# Patient Record
Sex: FEMALE | Race: OTHER | NOT HISPANIC OR LATINO
[De-identification: names, ages, dates, MRNs, and addresses within clinical notes are randomized per-mention and may not be internally consistent; named-entity substitution may affect disease eponyms.]

---

## 2017-11-14 ENCOUNTER — APPOINTMENT (OUTPATIENT)
Dept: ORTHOPEDIC SURGERY | Facility: CLINIC | Age: 37
End: 2017-11-14
Payer: COMMERCIAL

## 2017-11-14 VITALS
DIASTOLIC BLOOD PRESSURE: 89 MMHG | HEIGHT: 68 IN | BODY MASS INDEX: 36.98 KG/M2 | WEIGHT: 244 LBS | HEART RATE: 103 BPM | SYSTOLIC BLOOD PRESSURE: 140 MMHG

## 2017-11-14 VITALS — WEIGHT: 243 LBS | HEIGHT: 68 IN | BODY MASS INDEX: 36.83 KG/M2

## 2017-11-14 DIAGNOSIS — Z78.9 OTHER SPECIFIED HEALTH STATUS: ICD-10-CM

## 2017-11-14 DIAGNOSIS — Z86.018 PERSONAL HISTORY OF OTHER BENIGN NEOPLASM: ICD-10-CM

## 2017-11-14 DIAGNOSIS — Z82.61 FAMILY HISTORY OF ARTHRITIS: ICD-10-CM

## 2017-11-14 DIAGNOSIS — Z80.9 FAMILY HISTORY OF MALIGNANT NEOPLASM, UNSPECIFIED: ICD-10-CM

## 2017-11-14 DIAGNOSIS — Z87.09 PERSONAL HISTORY OF OTHER DISEASES OF THE RESPIRATORY SYSTEM: ICD-10-CM

## 2017-11-14 DIAGNOSIS — Z87.891 PERSONAL HISTORY OF NICOTINE DEPENDENCE: ICD-10-CM

## 2017-11-14 PROCEDURE — 73610 X-RAY EXAM OF ANKLE: CPT | Mod: LT

## 2017-11-14 PROCEDURE — 73620 X-RAY EXAM OF FOOT: CPT | Mod: LT

## 2017-11-14 PROCEDURE — 99204 OFFICE O/P NEW MOD 45 MIN: CPT

## 2017-11-14 RX ORDER — BIOTIN 10 MG
TABLET ORAL
Refills: 0 | Status: ACTIVE | COMMUNITY

## 2019-04-12 ENCOUNTER — TRANSCRIPTION ENCOUNTER (OUTPATIENT)
Age: 39
End: 2019-04-12

## 2019-12-27 ENCOUNTER — TRANSCRIPTION ENCOUNTER (OUTPATIENT)
Age: 39
End: 2019-12-27

## 2020-12-03 ENCOUNTER — ASOB RESULT (OUTPATIENT)
Age: 40
End: 2020-12-03

## 2020-12-03 ENCOUNTER — APPOINTMENT (OUTPATIENT)
Dept: MATERNAL FETAL MEDICINE | Facility: CLINIC | Age: 40
End: 2020-12-03
Payer: COMMERCIAL

## 2020-12-03 VITALS — HEIGHT: 68 IN | WEIGHT: 222 LBS | BODY MASS INDEX: 33.65 KG/M2

## 2020-12-03 PROCEDURE — 97802 MEDICAL NUTRITION INDIV IN: CPT | Mod: 95

## 2020-12-21 ENCOUNTER — APPOINTMENT (OUTPATIENT)
Dept: ANTEPARTUM | Facility: CLINIC | Age: 40
End: 2020-12-21
Payer: COMMERCIAL

## 2020-12-21 ENCOUNTER — ASOB RESULT (OUTPATIENT)
Age: 40
End: 2020-12-21

## 2020-12-21 PROCEDURE — 76813 OB US NUCHAL MEAS 1 GEST: CPT

## 2020-12-21 PROCEDURE — 99072 ADDL SUPL MATRL&STAF TM PHE: CPT

## 2020-12-21 PROCEDURE — 36416 COLLJ CAPILLARY BLOOD SPEC: CPT

## 2020-12-28 LAB
1ST TRIMESTER DATA: NORMAL
ADDENDUM DOC: NORMAL
AFP PNL SERPL: NORMAL
AFP SERPL-ACNC: NORMAL
CLINICAL BIOCHEMIST REVIEW: NORMAL
FREE BETA HCG 1ST TRIMESTER: NORMAL
Lab: NORMAL
NOTES NTD: NORMAL
NT: NORMAL
PAPP-A SERPL-ACNC: NORMAL
TRISOMY 18/3: NORMAL

## 2020-12-29 ENCOUNTER — APPOINTMENT (OUTPATIENT)
Dept: ANTEPARTUM | Facility: CLINIC | Age: 40
End: 2020-12-29
Payer: COMMERCIAL

## 2020-12-29 ENCOUNTER — ASOB RESULT (OUTPATIENT)
Age: 40
End: 2020-12-29

## 2020-12-29 ENCOUNTER — APPOINTMENT (OUTPATIENT)
Dept: MATERNAL FETAL MEDICINE | Facility: CLINIC | Age: 40
End: 2020-12-29
Payer: COMMERCIAL

## 2020-12-29 VITALS
BODY MASS INDEX: 34.25 KG/M2 | WEIGHT: 226 LBS | OXYGEN SATURATION: 98 % | RESPIRATION RATE: 18 BRPM | DIASTOLIC BLOOD PRESSURE: 82 MMHG | SYSTOLIC BLOOD PRESSURE: 144 MMHG | HEIGHT: 68 IN

## 2020-12-29 VITALS — SYSTOLIC BLOOD PRESSURE: 130 MMHG | DIASTOLIC BLOOD PRESSURE: 88 MMHG

## 2020-12-29 DIAGNOSIS — Z86.19 PERSONAL HISTORY OF OTHER INFECTIOUS AND PARASITIC DISEASES: ICD-10-CM

## 2020-12-29 PROCEDURE — 99244 OFF/OP CNSLTJ NEW/EST MOD 40: CPT

## 2020-12-29 PROCEDURE — 76817 TRANSVAGINAL US OBSTETRIC: CPT

## 2020-12-29 PROCEDURE — 99072 ADDL SUPL MATRL&STAF TM PHE: CPT

## 2020-12-29 RX ORDER — PRENATAL VIT NO.126/IRON/FOLIC 28MG-0.8MG
28-0.8 TABLET ORAL
Refills: 0 | Status: ACTIVE | COMMUNITY

## 2020-12-29 RX ORDER — MV-MIN/FOLIC/VIT K/LYCOP/COQ10 200-100MCG
CAPSULE ORAL
Refills: 0 | Status: ACTIVE | COMMUNITY

## 2020-12-29 RX ORDER — CHROMIUM 200 MCG
TABLET ORAL
Refills: 0 | Status: ACTIVE | COMMUNITY

## 2020-12-29 RX ORDER — PHENTERMINE HYDROCHLORIDE 37.5 MG/1
CAPSULE ORAL
Refills: 0 | Status: DISCONTINUED | COMMUNITY
End: 2020-12-29

## 2020-12-29 NOTE — FAMILY HISTORY
[Reported Family History Of Birth Defects] : no congenital heart defects [Tonio-Sachs Carrier] : no Tonio-Sachs [Family History] : no mental retardation/autism [Reported Family History Of Genetic Disease] : no history of child defect in child of baby father

## 2020-12-29 NOTE — DISCUSSION/SUMMARY
[FreeTextEntry1] : We had the pleasure of meeting with your patient, Nela Lynch, for a maternal-fetal medicine consultation. As you know, the patient is a 40-year-old  at 13 2/7 weeks of gestation. Her pregnancy is complicated by advanced maternal age, fibroid uterus, prior myomectomy, mild chronic hypertension, and bariatric surgery (sleeve gastrectomy).\par \par She reports abdominal pain for the past few days which she attributes to either gas or fibroids. She denies leaking and bleeding.\par \par She was extensively counseled regarding the following issues:\par \par •	Advanced maternal age (AMA)\par \par Women who are of advanced maternal age (typically defined as age 35 at delivery) are at an increased risk for fetal aneuploidy, spontaneous , congenital malformations, diabetes, and hypertensive disorders of pregnancy including preeclampsia,  delivery, stillbirth, and low birth weight neonates. Genetic counseling is recommended to discuss options for prenatal screening and diagnosis. First-trimester screening was low risk. Non-invasive prenatal screening (NIPS) is recommended. Early glucose challenge test (GCT) to screen for gestational diabetes is recommended. If negative, this should be repeated at 24-28 weeks. For women age 40, antepartum fetal surveillance should begin by 36 weeks.\par \par •	Fibroid uterus\par •	Prior myomectomy\par \par The patient had a laparoscopic myomectomy in . An operative report was not available but she reports that several intramural fibroids were removed and she was told that a  section would be necessary in future pregnancies. For pregnant patients who have had a prior myomectomy in which the integrity of the uterine wall was disrupted, there is an increased risk for uterine rupture, especially during labor. Timing of delivery is individualized based on the type and extent of myomectomy surgery. Given the history of multiple large incisions on the uterus, a scheduled  delivery at 37 0/7 weeks is recommended, prior to the onset of labor to reduce the risk of uterine rupture (ACOG Committee Opinion #764). The placenta should be thoroughly evaluated throughout gestation for any evidence of placenta accreta. Of note, the patient reports that she is willing to receive clinically indicated blood products in a life-threatening situation. Regarding her current pain symptoms, I discussed that management options are limited in pregnancy. I suggested Colace for constipation/gas pain.\par \par •	Pregnancy with history of bariatric surgery (sleeve gastrectomy)\par \par Pregnancy outcomes are generally favorable in patients with a history of bariatric surgery. The main risks are related to residual obesity (her BMI is 33 kg/m2). Recent laboratory evaluation of nutritional status was not available for review. She recently met with our nutritionist and laboratory requisition was provided. It should be determined if she has any nutritional deficiencies and whether she requires additional supplementation (e.g. folate, iron, B12, calcium, vitamin D). Nutritional assessment should be repeated again in the third trimester to ensure adequate supplementation and to assess need for IM/IV routes of administration if inadequate. \par \par •	Chronic hypertension\par \par Women with pre-existing hypertension are at an increased risk for exacerbation of hypertension and super-imposed preeclampsia. The patient was previously on anti-hyerptensive medication but her blood pressures improved after bariatric surgery and they were discontinued. Today, he blood pressures was mildly elevated but normal on repeat measurement. It is recommended that women with chronic hypertension have baseline laboratory workup that includes: 24 hour urine protein collection or protein/creatinine ratio to establish a baseline of proteinuria, comprehensive metabolic profile (CMP) to evaluate renal and liver function, complete blood count (CBC) to check platelets and an electrocardiogram (ECG). Recent laboratory results were not available for review and it is not clear whether these were performed in early pregnancy. The fetus is at an increased risk for growth restriction. Therefore, ultrasounds for growth are recommended every 4 weeks after 20 weeks. Antepartum surveillance is recommended starting at 36 weeks. Home blood pressure monitoring is recommended. We discussed that the goal of antihypertensive treatment in pregnancy is to keep blood pressures out of the severe range (160/110). At this time, she does not require anti-hypertensive medication. However, I do recommend low-dose aspirin for preeclampsia risk reduction.\par \par \par Thank you for requesting a consultation on this patient for advanced maternal age, fibroid uterus, prior myomectomy, mild chronic hypertension, and bariatric surgery (sleeve gastrectomy). The majority of time (>50%) was spent on counseling and coordination of care with this patient. The approximate physician face-to-face time was 60 minutes.\par \par At the end of our discussion, the patient indicated that her questions were answered and she seemed satisfied with our discussion. Please do not hesitate to contact us with any questions.\par \par \par Sincerely,\par \par \par Price Flores MD, RYAN\par Attending Physician, Maternal-Fetal Medicine\par

## 2020-12-29 NOTE — CHIEF COMPLAINT
[G ___] : G [unfilled] [P ___] : P [unfilled] [de-identified] : advanced maternal age, fibroid uterus, prior myomectomy, mild chronic hypertension, and bariatric surgery (sleeve gastrectomy)

## 2020-12-29 NOTE — SURGICAL HISTORY
[Fibroids] : fibroids [Abn Paps] : no abnormal pap smears [Breast Disease] : no breast disease [STI's] : STI's [Infertility] : no infertility [Cysts] : cysts [OC Use] : no OC use [Last Pap: ___] : Last Pap: none [Last Mammo: ___] : Last Mammo: none

## 2020-12-29 NOTE — VITALS
[LMP (date): ___] : LMP was on [unfilled] [GA =___ Weeks] : which calculates to a GA of [unfilled] weeks [GA= ___ Days] : and [unfilled] day(s) [HERLINDA by LMP (date): ___] : The calculated HERLINDA by LMP is [unfilled] [By LMP] : this is the final HERLINDA

## 2020-12-29 NOTE — OB HISTORY
[Pregnancy History] : girl [___] : pregnancy complications occured [LMP: ___] : LMP: [unfilled] [HERLINDA: ___] : HERLINDA: [unfilled] [EGA: ___ wks] : EGA: [unfilled] wks [Spontaneous] : Spontaneous conception [Sonogram] : sonogram [at ___ wks] : at [unfilled] weeks [FreeTextEntry1] : [facility] due to fibroid pain.  Pt states the pain started on 12/27/20- switches from left to right on her abdomen and her pelvis.  Pt states she has been feeling very gassy and has been having diarrhea episodes frequently.  Pt thinks it is due to her diet during the holiday.  Pt eats vegan/gluten free but didn’t during the holiday.  Pt is AMA and has not seen GC.  Pt saw dietary because of hx of gastric sleeve in 4/2019.  Pt last saw specialist earlier this year.  Pt to get bariatric blood drawn ASAP- requisition given to patient to get blood drawn.  Pt has f/u with dietary on 1/25/2020- consult attached.  Pt has laparoscopic myomectomy in 4/2015.  Pt states for about a year she was taking BP medication and stopped taking due to her BP normalizing after losing weight.  Medication was stopped earlier this year as per pt.  Pt states she was dx with asthma as a child and has psoriatic arthritis- pt does not see a rheumatologist.  Pt states she has hx of chlamydia in 1998 which was treated.  No leaking or bleeding/cramping or ctx pain.  [Definite:  ___ (Date)] : the last menstrual period was [unfilled] [Normal Amount/Duration] : was of a normal amount and duration [Spotting Between  Menses] : no spotting between menses [Regular Cycle Intervals] : periods have been regular

## 2021-01-15 ENCOUNTER — APPOINTMENT (OUTPATIENT)
Dept: MATERNAL FETAL MEDICINE | Facility: CLINIC | Age: 41
End: 2021-01-15
Payer: COMMERCIAL

## 2021-01-15 ENCOUNTER — ASOB RESULT (OUTPATIENT)
Age: 41
End: 2021-01-15

## 2021-01-15 PROCEDURE — 99202 OFFICE O/P NEW SF 15 MIN: CPT | Mod: 95

## 2021-01-25 ENCOUNTER — APPOINTMENT (OUTPATIENT)
Dept: MATERNAL FETAL MEDICINE | Facility: CLINIC | Age: 41
End: 2021-01-25
Payer: COMMERCIAL

## 2021-01-25 ENCOUNTER — ASOB RESULT (OUTPATIENT)
Age: 41
End: 2021-01-25

## 2021-01-25 VITALS — WEIGHT: 228 LBS | HEIGHT: 68 IN | BODY MASS INDEX: 34.56 KG/M2

## 2021-01-25 LAB
25(OH)D3 SERPL-MCNC: 43.1 NG/ML
BASOPHILS # BLD AUTO: 0.03 K/UL
BASOPHILS NFR BLD AUTO: 0.3 %
CA-I SERPL-SCNC: 1.37 MMOL/L
CALCIUM SERPL-MCNC: 10.3 MG/DL
EOSINOPHIL # BLD AUTO: 0.1 K/UL
EOSINOPHIL NFR BLD AUTO: 1.1 %
ESTIMATED AVERAGE GLUCOSE: 103 MG/DL
FERRITIN SERPL-MCNC: 31 NG/ML
FOLATE SERPL-MCNC: 16.8 NG/ML
HBA1C MFR BLD HPLC: 5.2 %
HCT VFR BLD CALC: 33.9 %
HGB BLD-MCNC: 11.2 G/DL
IMM GRANULOCYTES NFR BLD AUTO: 0.2 %
IRON SATN MFR SERPL: 14 %
IRON SERPL-MCNC: 58 UG/DL
LYMPHOCYTES # BLD AUTO: 1.79 K/UL
LYMPHOCYTES NFR BLD AUTO: 20.6 %
MAN DIFF?: NORMAL
MCHC RBC-ENTMCNC: 30.7 PG
MCHC RBC-ENTMCNC: 33 GM/DL
MCV RBC AUTO: 92.9 FL
MONOCYTES # BLD AUTO: 0.43 K/UL
MONOCYTES NFR BLD AUTO: 4.9 %
NEUTROPHILS # BLD AUTO: 6.33 K/UL
NEUTROPHILS NFR BLD AUTO: 72.9 %
PLATELET # BLD AUTO: 357 K/UL
RBC # BLD: 3.65 M/UL
RBC # FLD: 13.2 %
TIBC SERPL-MCNC: 418 UG/DL
UIBC SERPL-MCNC: 360 UG/DL
VIT B12 SERPL-MCNC: 873 PG/ML
WBC # FLD AUTO: 8.7 K/UL

## 2021-01-25 PROCEDURE — 97803 MED NUTRITION INDIV SUBSEQ: CPT | Mod: 95

## 2021-01-27 LAB
VIT B1 SERPL-MCNC: 122 NMOL/L
ZINC SERPL-MCNC: 91 UG/DL

## 2021-01-29 LAB — VIT A SERPL-MCNC: 41.6 UG/DL

## 2021-02-02 LAB
A-TOCOPHEROL VIT E SERPL-MCNC: 14 MG/L
BETA+GAMMA TOCOPHEROL SERPL-MCNC: 1.4 MG/L

## 2021-02-15 ENCOUNTER — ASOB RESULT (OUTPATIENT)
Age: 41
End: 2021-02-15

## 2021-02-15 ENCOUNTER — APPOINTMENT (OUTPATIENT)
Dept: ANTEPARTUM | Facility: CLINIC | Age: 41
End: 2021-02-15
Payer: COMMERCIAL

## 2021-02-15 ENCOUNTER — APPOINTMENT (OUTPATIENT)
Dept: MATERNAL FETAL MEDICINE | Facility: CLINIC | Age: 41
End: 2021-02-15
Payer: COMMERCIAL

## 2021-02-15 VITALS
OXYGEN SATURATION: 98 % | RESPIRATION RATE: 18 BRPM | HEIGHT: 68 IN | DIASTOLIC BLOOD PRESSURE: 68 MMHG | SYSTOLIC BLOOD PRESSURE: 102 MMHG | WEIGHT: 234 LBS | BODY MASS INDEX: 35.46 KG/M2 | HEART RATE: 73 BPM

## 2021-02-15 DIAGNOSIS — O99.841 BARIATRIC SURGERY STATUS COMPLICATING PREGNANCY, FIRST TRIMESTER: ICD-10-CM

## 2021-02-15 PROCEDURE — 76811 OB US DETAILED SNGL FETUS: CPT

## 2021-02-15 PROCEDURE — 99242 OFF/OP CONSLTJ NEW/EST SF 20: CPT

## 2021-02-15 PROCEDURE — 76817 TRANSVAGINAL US OBSTETRIC: CPT

## 2021-02-15 PROCEDURE — 99072 ADDL SUPL MATRL&STAF TM PHE: CPT

## 2021-02-15 NOTE — ACTIVE PROBLEMS
[Diabetes Mellitus] : no diabetes mellitus [Hypertension] : no hypertension [Autoimmune Disease] : no autoimmune disease [Heart Disease] : no heart disease [Renal Disease] : no kidney disease, no UTI [Psychiatric Disorders] : no psychiatric disorders [Neurologic Disorder] : no neurologic disorder, no epilepsy [Depression] : no depression, no post partum depression [Hepatic Disorder] : no hepatitis, no liver disease [Thrombophlebitis] : no varicosities, no phlebitis [Trauma] : no trauma/violence [Thyroid Disorder] : no thyroid dysfunction [Blood Transfusion (___ Ml)] : no history of blood transfusion

## 2021-02-15 NOTE — CHIEF COMPLAINT
[G ___] : G [unfilled] [P ___] : P [unfilled] [de-identified] : follow-up for advanced maternal age, fibroid uterus, prior myomectomy, mild chronic hypertension (resolved after weight loss), and bariatric surgery (sleeve gastrectomy)

## 2021-02-15 NOTE — SURGICAL HISTORY
[Fibroids] : fibroids [Abn Paps] : no abnormal pap smears [Breast Disease] : no breast disease [STI's] : STI's [Infertility] : no infertility [OC Use] : no OC use [Cysts] : cysts [Last Pap: ___] : Last Pap: none [Last Mammo: ___] : Last Mammo: none

## 2021-02-15 NOTE — DISCUSSION/SUMMARY
[FreeTextEntry1] : We had the pleasure of meeting with your patient, Nela Lynch, for a maternal-fetal medicine consultation. As you know, the patient is a 40-year-old  at 20 1/7 weeks of gestation. Her pregnancy is complicated by advanced maternal age, fibroid uterus, prior myomectomy, mild chronic hypertension, and bariatric surgery (sleeve gastrectomy).\par \par She reports abdominal pain for the past few days which she attributes to either gas or fibroids. She denies leaking and bleeding.\par \par She was counseled regarding the following issues:\par \par •	Fibroid uterus and prior myomectomy\par \par Scheduled  delivery at 37 0/7 weeks is recommended, prior to the onset of labor to reduce the risk of uterine rupture (ACOG Committee Opinion #[id number]). \par \par •	Pregnancy with history of bariatric surgery (sleeve gastrectomy)\par \par Recent laboratory evaluation of nutritional status was normal. She is followed by our nutritionist. Nutritional assessment should be repeated again in the third trimester to ensure adequate supplementation and to assess need for IM/IV routes of administration if inadequate. \par \par •	Chronic hypertension, improved after weight loss/gastric sleeve\par \par Blood pressure normal today. The fetus is at an increased risk for growth restriction. Serial ultrasounds for growth recommended. Home blood pressure monitoring is recommended. At this time, she does not require anti-hypertensive medication. Continue low-dose aspirin for preeclampsia risk reduction.\par \par Follow-up in 2 weeks for anatomy completion ultrasound\par Follow-up in 8 weeks for growth ultrasound and MFM consultation\par \par \par Thank you for requesting a consultation on this patient for advanced maternal age, fibroid uterus, prior myomectomy, mild chronic hypertension, and bariatric surgery (sleeve gastrectomy). The majority of time (>50%) was spent on counseling and coordination of care with this patient. The approximate physician face-to-face time was 30 minutes.\par \par At the end of our discussion, the patient indicated that her questions were answered and she seemed satisfied with our discussion. Please do not hesitate to contact us with any questions.\par \par \par Sincerely,\par \par \par Price Flores MD, RYNA\par Attending Physician, Maternal-Fetal Medicine\par

## 2021-02-15 NOTE — OB HISTORY
[Pregnancy History] : girl [___] : pregnancy complications occured [LMP: ___] : LMP: [unfilled] [HERLINDA: ___] : HERLINDA: [unfilled] [EGA: ___ wks] : EGA: [unfilled] wks [Spontaneous] : Spontaneous conception [Sonogram] : sonogram [at ___ wks] : at [unfilled] weeks [Definite:  ___ (Date)] : the last menstrual period was [unfilled] [Normal Amount/Duration] : was of a normal amount and duration [Spotting Between  Menses] : no spotting between menses [Regular Cycle Intervals] : periods have been regular

## 2021-02-18 LAB
1ST TRIMESTER DATA: NORMAL
2ND TRIMESTER DATA: NORMAL
AFP PNL SERPL: NORMAL
AFP SERPL-ACNC: NORMAL
AFP SERPL-ACNC: NORMAL
B-HCG FREE SERPL-MCNC: NORMAL
CLINICAL BIOCHEMIST REVIEW: NORMAL
FREE BETA HCG 1ST TRIMESTER: NORMAL
INHIBIN A SERPL-MCNC: NORMAL
NOTES NTD: NORMAL
NT: NORMAL
PAPP-A SERPL-ACNC: NORMAL
U ESTRIOL SERPL-SCNC: NORMAL

## 2021-02-24 ENCOUNTER — ASOB RESULT (OUTPATIENT)
Age: 41
End: 2021-02-24

## 2021-02-24 ENCOUNTER — APPOINTMENT (OUTPATIENT)
Dept: ANTEPARTUM | Facility: CLINIC | Age: 41
End: 2021-02-24
Payer: COMMERCIAL

## 2021-02-24 PROCEDURE — 76816 OB US FOLLOW-UP PER FETUS: CPT

## 2021-02-24 PROCEDURE — 99072 ADDL SUPL MATRL&STAF TM PHE: CPT

## 2021-03-01 ENCOUNTER — APPOINTMENT (OUTPATIENT)
Dept: ANTEPARTUM | Facility: CLINIC | Age: 41
End: 2021-03-01

## 2021-03-03 ENCOUNTER — ASOB RESULT (OUTPATIENT)
Age: 41
End: 2021-03-03

## 2021-03-03 ENCOUNTER — APPOINTMENT (OUTPATIENT)
Dept: ANTEPARTUM | Facility: CLINIC | Age: 41
End: 2021-03-03
Payer: COMMERCIAL

## 2021-03-03 PROCEDURE — 76816 OB US FOLLOW-UP PER FETUS: CPT

## 2021-03-03 PROCEDURE — 99072 ADDL SUPL MATRL&STAF TM PHE: CPT

## 2021-03-08 ENCOUNTER — ASOB RESULT (OUTPATIENT)
Age: 41
End: 2021-03-08

## 2021-03-08 ENCOUNTER — APPOINTMENT (OUTPATIENT)
Dept: MATERNAL FETAL MEDICINE | Facility: CLINIC | Age: 41
End: 2021-03-08
Payer: COMMERCIAL

## 2021-03-08 VITALS — WEIGHT: 232 LBS | BODY MASS INDEX: 35.16 KG/M2 | HEIGHT: 68 IN

## 2021-03-08 PROCEDURE — G0108 DIAB MANAGE TRN  PER INDIV: CPT | Mod: 95

## 2021-04-12 ENCOUNTER — APPOINTMENT (OUTPATIENT)
Dept: MATERNAL FETAL MEDICINE | Facility: CLINIC | Age: 41
End: 2021-04-12
Payer: COMMERCIAL

## 2021-04-12 ENCOUNTER — APPOINTMENT (OUTPATIENT)
Dept: ANTEPARTUM | Facility: CLINIC | Age: 41
End: 2021-04-12
Payer: COMMERCIAL

## 2021-04-12 ENCOUNTER — ASOB RESULT (OUTPATIENT)
Age: 41
End: 2021-04-12

## 2021-04-12 VITALS
SYSTOLIC BLOOD PRESSURE: 108 MMHG | DIASTOLIC BLOOD PRESSURE: 62 MMHG | OXYGEN SATURATION: 98 % | RESPIRATION RATE: 18 BRPM | HEART RATE: 68 BPM | WEIGHT: 245 LBS | HEIGHT: 68 IN | BODY MASS INDEX: 37.13 KG/M2

## 2021-04-12 PROCEDURE — 99072 ADDL SUPL MATRL&STAF TM PHE: CPT

## 2021-04-12 PROCEDURE — 76816 OB US FOLLOW-UP PER FETUS: CPT

## 2021-04-12 PROCEDURE — 99214 OFFICE O/P EST MOD 30 MIN: CPT

## 2021-04-12 RX ORDER — CHLORHEXIDINE GLUCONATE 4 %
325 (65 FE) LIQUID (ML) TOPICAL
Refills: 0 | Status: ACTIVE | COMMUNITY

## 2021-04-12 RX ORDER — CALCIUM CARBONATE/VITAMIN D3 600 MG-10
TABLET ORAL
Refills: 0 | Status: ACTIVE | COMMUNITY

## 2021-04-12 RX ORDER — CHROMIUM 200 MCG
TABLET ORAL
Refills: 0 | Status: ACTIVE | COMMUNITY

## 2021-04-12 RX ORDER — MAGNESIUM OXIDE/MAG AA CHELATE 300 MG
CAPSULE ORAL
Refills: 0 | Status: ACTIVE | COMMUNITY

## 2021-04-12 RX ORDER — BACILLUS COAGULANS/INULIN 1B-250 MG
CAPSULE ORAL
Refills: 0 | Status: ACTIVE | COMMUNITY

## 2021-04-12 NOTE — CHIEF COMPLAINT
[G ___] : G [unfilled] [P ___] : P [unfilled] [de-identified] : follow-up for advanced maternal age, fibroid uterus, prior myomectomy, mild chronic hypertension (resolved after weight loss), and bariatric surgery (sleeve gastrectomy)

## 2021-04-12 NOTE — DISCUSSION/SUMMARY
[FreeTextEntry1] : We had the pleasure of meeting with your patient, Nela Lynch, for a maternal-fetal medicine consultation. As you know, the patient is a 40-year-old  at 28 1/7 weeks of gestation. Her pregnancy is complicated by advanced maternal age, fibroid uterus, prior myomectomy, mild chronic hypertension, and bariatric surgery (sleeve gastrectomy).\par \par She denies cramping, leaking and bleeding. Good fetal movement noted. Denies headache, vision changes, CP/SOB, RUQ/epigastric pain, N/V. \par \par She was counseled regarding the following issues:\par \par •	Fibroid uterus and prior myomectomy\par \par Scheduled  delivery at 37 0/7 weeks is recommended, prior to the onset of labor to reduce the risk of uterine rupture (ACOG Committee Opinion #764). \par \par •	Pregnancy with history of bariatric surgery (sleeve gastrectomy)\par •	Iron deficiency anemia\par \par She is followed by our nutritionist. Labs normal with exception of iron deficiency. Last Hct 30. She will receive IV iron infusions starting on Friday of this week.\par \par •	Chronic hypertension, improved after weight loss/gastric sleeve\par \par Blood pressure normal today. Continue serial ultrasounds for growth. Anti-hypertensive medication not indicated. Continue low-dose aspirin for preeclampsia risk reduction.\par \par Follow-up in 4 weeks for growth ultrasound\par \par \par Thank you for requesting a consultation on this patient for advanced maternal age, fibroid uterus, prior myomectomy, mild chronic hypertension, and bariatric surgery (sleeve gastrectomy). The majority of time (>50%) was spent on counseling and coordination of care with this patient. The physician face-to-face time was 30 minutes.\par \par At the end of our discussion, the patient indicated that her questions were answered and she seemed satisfied with our discussion. Please do not hesitate to contact us with any questions.\par \par \par Sincerely,\par \par \par Price Flores MD, RYAN\par Attending Physician, Maternal-Fetal Medicine\par

## 2021-04-19 ENCOUNTER — APPOINTMENT (OUTPATIENT)
Dept: MATERNAL FETAL MEDICINE | Facility: CLINIC | Age: 41
End: 2021-04-19
Payer: COMMERCIAL

## 2021-04-19 ENCOUNTER — ASOB RESULT (OUTPATIENT)
Age: 41
End: 2021-04-19

## 2021-04-19 PROCEDURE — 97803 MED NUTRITION INDIV SUBSEQ: CPT | Mod: 95

## 2021-05-10 ENCOUNTER — APPOINTMENT (OUTPATIENT)
Dept: ANTEPARTUM | Facility: CLINIC | Age: 41
End: 2021-05-10
Payer: COMMERCIAL

## 2021-05-10 ENCOUNTER — ASOB RESULT (OUTPATIENT)
Age: 41
End: 2021-05-10

## 2021-05-10 PROCEDURE — 99072 ADDL SUPL MATRL&STAF TM PHE: CPT

## 2021-05-10 PROCEDURE — 76816 OB US FOLLOW-UP PER FETUS: CPT

## 2021-05-13 ENCOUNTER — NON-APPOINTMENT (OUTPATIENT)
Age: 41
End: 2021-05-13

## 2021-05-17 ENCOUNTER — ASOB RESULT (OUTPATIENT)
Age: 41
End: 2021-05-17

## 2021-05-17 ENCOUNTER — APPOINTMENT (OUTPATIENT)
Dept: MATERNAL FETAL MEDICINE | Facility: CLINIC | Age: 41
End: 2021-05-17
Payer: COMMERCIAL

## 2021-05-17 VITALS — HEIGHT: 68 IN | BODY MASS INDEX: 36.37 KG/M2 | WEIGHT: 240 LBS

## 2021-05-17 PROCEDURE — 97803 MED NUTRITION INDIV SUBSEQ: CPT | Mod: 95

## 2021-06-07 ENCOUNTER — APPOINTMENT (OUTPATIENT)
Dept: ANTEPARTUM | Facility: CLINIC | Age: 41
End: 2021-06-07

## 2021-06-07 ENCOUNTER — ASOB RESULT (OUTPATIENT)
Age: 41
End: 2021-06-07

## 2021-06-07 ENCOUNTER — APPOINTMENT (OUTPATIENT)
Dept: ANTEPARTUM | Facility: CLINIC | Age: 41
End: 2021-06-07
Payer: COMMERCIAL

## 2021-06-07 PROCEDURE — 76819 FETAL BIOPHYS PROFIL W/O NST: CPT

## 2021-06-07 PROCEDURE — 76816 OB US FOLLOW-UP PER FETUS: CPT

## 2021-06-07 PROCEDURE — 99072 ADDL SUPL MATRL&STAF TM PHE: CPT

## 2021-06-10 ENCOUNTER — APPOINTMENT (OUTPATIENT)
Dept: ANTEPARTUM | Facility: CLINIC | Age: 41
End: 2021-06-10
Payer: COMMERCIAL

## 2021-06-10 PROCEDURE — 99072 ADDL SUPL MATRL&STAF TM PHE: CPT

## 2021-06-10 PROCEDURE — 76818 FETAL BIOPHYS PROFILE W/NST: CPT

## 2021-06-14 ENCOUNTER — APPOINTMENT (OUTPATIENT)
Dept: ANTEPARTUM | Facility: CLINIC | Age: 41
End: 2021-06-14
Payer: COMMERCIAL

## 2021-06-14 ENCOUNTER — ASOB RESULT (OUTPATIENT)
Age: 41
End: 2021-06-14

## 2021-06-14 PROCEDURE — 99072 ADDL SUPL MATRL&STAF TM PHE: CPT

## 2021-06-14 PROCEDURE — 76818 FETAL BIOPHYS PROFILE W/NST: CPT

## 2024-01-05 ENCOUNTER — NON-APPOINTMENT (OUTPATIENT)
Age: 44
End: 2024-01-05

## 2024-01-05 ENCOUNTER — APPOINTMENT (OUTPATIENT)
Dept: INTERNAL MEDICINE | Facility: CLINIC | Age: 44
End: 2024-01-05
Payer: COMMERCIAL

## 2024-01-05 VITALS
TEMPERATURE: 97.5 F | WEIGHT: 236 LBS | BODY MASS INDEX: 35.77 KG/M2 | OXYGEN SATURATION: 98 % | HEIGHT: 68 IN | DIASTOLIC BLOOD PRESSURE: 70 MMHG | HEART RATE: 93 BPM | RESPIRATION RATE: 18 BRPM | SYSTOLIC BLOOD PRESSURE: 110 MMHG

## 2024-01-05 DIAGNOSIS — O16.9 UNSPECIFIED MATERNAL HYPERTENSION, UNSPECIFIED TRIMESTER: ICD-10-CM

## 2024-01-05 DIAGNOSIS — O09.521 SUPERVISION OF ELDERLY MULTIGRAVIDA, FIRST TRIMESTER: ICD-10-CM

## 2024-01-05 DIAGNOSIS — O99.840 BARIATRIC SURGERY STATUS COMPLICATING PREGNANCY, UNSPECIFIED TRIMESTER: ICD-10-CM

## 2024-01-05 DIAGNOSIS — I10 ESSENTIAL (PRIMARY) HYPERTENSION: ICD-10-CM

## 2024-01-05 DIAGNOSIS — Z78.9 OTHER SPECIFIED HEALTH STATUS: ICD-10-CM

## 2024-01-05 DIAGNOSIS — O99.210 OBESITY COMPLICATING PREGNANCY, UNSPECIFIED TRIMESTER: ICD-10-CM

## 2024-01-05 DIAGNOSIS — J45.20 MILD INTERMITTENT ASTHMA, UNCOMPLICATED: ICD-10-CM

## 2024-01-05 DIAGNOSIS — L40.50 ARTHROPATHIC PSORIASIS, UNSPECIFIED: ICD-10-CM

## 2024-01-05 DIAGNOSIS — E55.9 VITAMIN D DEFICIENCY, UNSPECIFIED: ICD-10-CM

## 2024-01-05 DIAGNOSIS — Z87.898 PERSONAL HISTORY OF OTHER SPECIFIED CONDITIONS: ICD-10-CM

## 2024-01-05 DIAGNOSIS — Z98.890 OTHER SPECIFIED POSTPROCEDURAL STATES: ICD-10-CM

## 2024-01-05 DIAGNOSIS — O35.10X0 MATERNAL CARE FOR (SUSPECTED) CHROMOSOMAL ABNORMALITY IN FETUS, UNSPECIFIED, NOT APPLICABLE OR UNSPECIFIED: ICD-10-CM

## 2024-01-05 DIAGNOSIS — M25.872 OTHER SPECIFIED JOINT DISORDERS, LEFT ANKLE AND FOOT: ICD-10-CM

## 2024-01-05 DIAGNOSIS — S90.859A SUPERFICIAL FOREIGN BODY, UNSPECIFIED FOOT, INITIAL ENCOUNTER: ICD-10-CM

## 2024-01-05 DIAGNOSIS — M25.572 PAIN IN LEFT ANKLE AND JOINTS OF LEFT FOOT: ICD-10-CM

## 2024-01-05 DIAGNOSIS — M24.9 JOINT DERANGEMENT, UNSPECIFIED: ICD-10-CM

## 2024-01-05 DIAGNOSIS — E78.5 HYPERLIPIDEMIA, UNSPECIFIED: ICD-10-CM

## 2024-01-05 DIAGNOSIS — M62.462 CONTRACTURE OF MUSCLE, LEFT LOWER LEG: ICD-10-CM

## 2024-01-05 DIAGNOSIS — D25.9 LEIOMYOMA OF UTERUS, UNSPECIFIED: ICD-10-CM

## 2024-01-05 DIAGNOSIS — Z00.00 ENCOUNTER FOR GENERAL ADULT MEDICAL EXAMINATION W/OUT ABNORMAL FINDINGS: ICD-10-CM

## 2024-01-05 DIAGNOSIS — D50.9 IRON DEFICIENCY ANEMIA, UNSPECIFIED: ICD-10-CM

## 2024-01-05 PROCEDURE — 99386 PREV VISIT NEW AGE 40-64: CPT

## 2024-01-05 RX ORDER — ASPIRIN 81 MG
81 TABLET, DELAYED RELEASE (ENTERIC COATED) ORAL
Refills: 0 | Status: DISCONTINUED | COMMUNITY
End: 2024-01-05

## 2024-01-05 RX ORDER — ATORVASTATIN CALCIUM 20 MG/1
20 TABLET, FILM COATED ORAL
Refills: 0 | Status: ACTIVE | COMMUNITY

## 2024-01-05 RX ORDER — ALBUTEROL SULFATE 90 UG/1
108 (90 BASE) INHALANT RESPIRATORY (INHALATION)
Qty: 1 | Refills: 1 | Status: ACTIVE | COMMUNITY
Start: 2024-01-05 | End: 1900-01-01

## 2024-01-05 RX ORDER — BUDESONIDE AND FORMOTEROL FUMARATE DIHYDRATE 160; 4.5 UG/1; UG/1
160-4.5 AEROSOL RESPIRATORY (INHALATION) TWICE DAILY
Qty: 1 | Refills: 2 | Status: ACTIVE | COMMUNITY
Start: 2024-01-05 | End: 1900-01-01

## 2024-01-05 RX ORDER — CHLORTHALIDONE 25 MG/1
25 TABLET ORAL
Refills: 0 | Status: ACTIVE | COMMUNITY

## 2024-01-05 RX ORDER — SEMAGLUTIDE 0.25 MG/.5ML
0.25 INJECTION, SOLUTION SUBCUTANEOUS
Refills: 0 | Status: ACTIVE | COMMUNITY

## 2024-01-05 NOTE — PLAN
[FreeTextEntry1] : HCM:  - UTD with pap smear  - Due for mammogram, script provided  - Check routine labs today   Iron deficiency anemia:  - Check iron studies, CBC, fatigue is likely related to underlying iron deficiency   HTN:  - BP well controlled  - Continue chlorthalidone 25 mg daily   HLD:  - Pt not consistent with statin  - Check CMP and lipid profile   Mild Intermittent asthma:  - Continue albuterol inhaler PRN and Symbicort  -

## 2024-01-05 NOTE — HEALTH RISK ASSESSMENT
[Good] : ~his/her~  mood as  good [Yes] : Yes [2 - 3 times a week (3 pts)] : 2 - 3  times a week (3 points) [1 or 2 (0 pts)] : 1 or 2 (0 points) [Never (0 pts)] : Never (0 points) [No] : In the past 12 months have you used drugs other than those required for medical reasons? No [Little interest or pleasure doing things] : 1) Little interest or pleasure doing things [2] : 1) Little interest or pleasure doing things for more than half of the days (2) [Feeling down, depressed, or hopeless] : 2) Feeling down, depressed, or hopeless [1] : 2) Feeling down, depressed, or hopeless for several days (1) [PHQ-2 Positive] : PHQ-2 Positive [1/2 of Days or More (2)] : 1.) Little interest or pleasure in doing things? Half the days or more [Several Days (1)] : 8.) Moving or speaking so slowly that other people could have noticed, or the opposite, moving or speaking faster than usual? Several days [Mild] : severity of depression is mild [Not at all] : How difficult have these problems made it for you to do your work, take care of things at home, or get along with people? Not at all [Patient reported mammogram was normal] : Patient reported mammogram was normal [Patient reported PAP Smear was normal] : Patient reported PAP Smear was normal [With Family] : lives with family [Employed] : employed [] :  [# Of Children ___] : has [unfilled] children [Feels Safe at Home] : Feels safe at home [Fully functional (bathing, dressing, toileting, transferring, walking, feeding)] : Fully functional (bathing, dressing, toileting, transferring, walking, feeding) [Fully functional (using the telephone, shopping, preparing meals, housekeeping, doing laundry, using] : Fully functional and needs no help or supervision to perform IADLs (using the telephone, shopping, preparing meals, housekeeping, doing laundry, using transportation, managing medications and managing finances) [Never] : Never [Audit-CScore] : 3 [DCW5Dipet] : 3 [[id number]] : 9 [MammogramDate] : 01/21 [PapSmearDate] : 01/23

## 2024-01-05 NOTE — HISTORY OF PRESENT ILLNESS
[FreeTextEntry1] : CPE  [de-identified] : 43 year old female with PMH HTN, HLD, psoriatic arthritis, iron deficiency anemia, vitamin B12 deficiency, mild intermittent asthma who presents today to establish care/for CPE.  She is doing well overall. She has noticed that over the past few weeks she has been very fatigued and tired. She has not been consistent with taking her iron supplements. She has a history of iron deficiency anemia 2/2 heavy menses due to fibroids and uterine polyps.

## 2024-01-08 LAB
25(OH)D3 SERPL-MCNC: 73.7 NG/ML
ALBUMIN SERPL ELPH-MCNC: 4.8 G/DL
ALP BLD-CCNC: 79 U/L
ALT SERPL-CCNC: 35 U/L
ANION GAP SERPL CALC-SCNC: 14 MMOL/L
AST SERPL-CCNC: 40 U/L
BASOPHILS # BLD AUTO: 0.03 K/UL
BASOPHILS NFR BLD AUTO: 0.4 %
BILIRUB SERPL-MCNC: 0.5 MG/DL
BUN SERPL-MCNC: 12 MG/DL
CALCIUM SERPL-MCNC: 10 MG/DL
CHLORIDE SERPL-SCNC: 96 MMOL/L
CHOLEST SERPL-MCNC: 267 MG/DL
CO2 SERPL-SCNC: 30 MMOL/L
CREAT SERPL-MCNC: 0.89 MG/DL
EGFR: 82 ML/MIN/1.73M2
EOSINOPHIL # BLD AUTO: 0.15 K/UL
EOSINOPHIL NFR BLD AUTO: 1.9 %
ESTIMATED AVERAGE GLUCOSE: 117 MG/DL
FERRITIN SERPL-MCNC: 105 NG/ML
FOLATE SERPL-MCNC: 15 NG/ML
GLUCOSE SERPL-MCNC: 98 MG/DL
HBA1C MFR BLD HPLC: 5.7 %
HCT VFR BLD CALC: 40 %
HDLC SERPL-MCNC: 53 MG/DL
HGB BLD-MCNC: 13.5 G/DL
IMM GRANULOCYTES NFR BLD AUTO: 0.3 %
IRON SATN MFR SERPL: 12 %
IRON SERPL-MCNC: 50 UG/DL
LDLC SERPL CALC-MCNC: 180 MG/DL
LYMPHOCYTES # BLD AUTO: 2.03 K/UL
LYMPHOCYTES NFR BLD AUTO: 25.6 %
MAN DIFF?: NORMAL
MCHC RBC-ENTMCNC: 31.2 PG
MCHC RBC-ENTMCNC: 33.8 GM/DL
MCV RBC AUTO: 92.4 FL
MONOCYTES # BLD AUTO: 0.74 K/UL
MONOCYTES NFR BLD AUTO: 9.3 %
NEUTROPHILS # BLD AUTO: 4.96 K/UL
NEUTROPHILS NFR BLD AUTO: 62.5 %
NONHDLC SERPL-MCNC: 214 MG/DL
PLATELET # BLD AUTO: 345 K/UL
POTASSIUM SERPL-SCNC: 3.7 MMOL/L
PROT SERPL-MCNC: 8 G/DL
RBC # BLD: 4.33 M/UL
RBC # FLD: 13.2 %
SODIUM SERPL-SCNC: 139 MMOL/L
TIBC SERPL-MCNC: 430 UG/DL
TRIGL SERPL-MCNC: 182 MG/DL
TSH SERPL-ACNC: 1.03 UIU/ML
UIBC SERPL-MCNC: 380 UG/DL
VIT B12 SERPL-MCNC: 1066 PG/ML
WBC # FLD AUTO: 7.93 K/UL

## 2024-09-11 ENCOUNTER — APPOINTMENT (OUTPATIENT)
Dept: VASCULAR SURGERY | Facility: CLINIC | Age: 44
End: 2024-09-11
Payer: COMMERCIAL

## 2024-09-11 VITALS — WEIGHT: 210 LBS | HEIGHT: 68 IN | OXYGEN SATURATION: 99 % | HEART RATE: 96 BPM | BODY MASS INDEX: 31.83 KG/M2

## 2024-09-11 VITALS — DIASTOLIC BLOOD PRESSURE: 82 MMHG | SYSTOLIC BLOOD PRESSURE: 128 MMHG

## 2024-09-11 DIAGNOSIS — L97.928 VARICOSE VEINS OF LEFT LOWER EXTREMITY WITH BOTH ULCER OF UNSPECIFIED SITE AND INFLAMMATION: ICD-10-CM

## 2024-09-11 DIAGNOSIS — I83.229 VARICOSE VEINS OF LEFT LOWER EXTREMITY WITH BOTH ULCER OF UNSPECIFIED SITE AND INFLAMMATION: ICD-10-CM

## 2024-09-11 DIAGNOSIS — I83.893 VARICOSE VEINS OF BILATERAL LOWER EXTREMITIES WITH OTHER COMPLICATIONS: ICD-10-CM

## 2024-09-11 PROCEDURE — 93970 EXTREMITY STUDY: CPT

## 2024-09-11 PROCEDURE — 99203 OFFICE O/P NEW LOW 30 MIN: CPT

## 2024-09-11 NOTE — ASSESSMENT
[FreeTextEntry1] : 45 yo female with varicose veins of legs bilaterally. Chronic venous insufficiency on duplex.   Pt counseled on results of duplex and above diagnosis. Pt counseled to continue to use compression stockings  Pt counseled to walk daily and elevate legs at night Plan for RLE varithena followed by LLE varithena. Discussed the risks and benefits of the procedure with the patient. All questions answered.  A total of 30 minutes was spent with patient and coordinating care

## 2024-09-11 NOTE — HISTORY OF PRESENT ILLNESS
[FreeTextEntry1] : 45 yo female presents for evaluation of BLE varicose veins. Pt has had varicose veins of many years, recently becoming more painful. She noticed the varicose veins started during her first pregnancy 23 years ago. She also has swelling of BLE which is worse towards the end of the day after standing. Pt has been using 20-30 mmHg compression stockings for over 3 months without significant improvement in pain or swelling. Pt denies hx of DVT. Denies recent fever or chills.

## 2024-09-11 NOTE — PROCEDURE
[FreeTextEntry1] : 9/11/24 BLE venous duplex: GSV and SSV normal bilaterally, right lateral non saphenous varicose  veins with reflux >4.5mm.  left mid thigh varicose vein with reflux thigh (3.1mm).  No DVT bilaterally.

## 2024-09-11 NOTE — PHYSICAL EXAM
[2+] : left 2+ [Ankle Swelling (On Exam)] : present [Ankle Swelling Bilaterally] : bilaterally  [Varicose Veins Of Lower Extremities] : bilaterally [Ankle Swelling On The Right] : mild [] : not present [de-identified] : NAD

## 2024-10-16 ENCOUNTER — APPOINTMENT (OUTPATIENT)
Dept: VASCULAR SURGERY | Facility: CLINIC | Age: 44
End: 2024-10-16

## 2024-11-04 ENCOUNTER — APPOINTMENT (OUTPATIENT)
Dept: INTERNAL MEDICINE | Facility: CLINIC | Age: 44
End: 2024-11-04
Payer: COMMERCIAL

## 2024-11-04 VITALS
OXYGEN SATURATION: 98 % | TEMPERATURE: 98.1 F | RESPIRATION RATE: 14 BRPM | HEART RATE: 74 BPM | SYSTOLIC BLOOD PRESSURE: 110 MMHG | HEIGHT: 68 IN | BODY MASS INDEX: 31.07 KG/M2 | DIASTOLIC BLOOD PRESSURE: 70 MMHG | WEIGHT: 205 LBS

## 2024-11-04 DIAGNOSIS — I10 ESSENTIAL (PRIMARY) HYPERTENSION: ICD-10-CM

## 2024-11-04 DIAGNOSIS — E78.5 HYPERLIPIDEMIA, UNSPECIFIED: ICD-10-CM

## 2024-11-04 DIAGNOSIS — Z02.89 ENCOUNTER FOR OTHER ADMINISTRATIVE EXAMINATIONS: ICD-10-CM

## 2024-11-04 DIAGNOSIS — Z23 ENCOUNTER FOR IMMUNIZATION: ICD-10-CM

## 2024-11-04 PROCEDURE — 99203 OFFICE O/P NEW LOW 30 MIN: CPT

## 2024-11-04 RX ORDER — ROSUVASTATIN CALCIUM 5 MG/1
5 TABLET, FILM COATED ORAL
Refills: 0 | Status: ACTIVE | COMMUNITY

## 2024-11-05 LAB
CHOLEST SERPL-MCNC: 233 MG/DL
HBV SURFACE AB SER QL: REACTIVE
HDLC SERPL-MCNC: 63 MG/DL
LDLC SERPL CALC-MCNC: 147 MG/DL
MEV IGG FLD QL IA: 67.3 AU/ML
MEV IGG+IGM SER-IMP: POSITIVE
MUV AB SER-ACNC: POSITIVE
MUV IGG SER QL IA: 266 AU/ML
NONHDLC SERPL-MCNC: 170 MG/DL
RUBV IGG FLD-ACNC: 1.89 INDEX
RUBV IGG SER-IMP: POSITIVE
TRIGL SERPL-MCNC: 128 MG/DL
VZV AB TITR SER: POSITIVE
VZV IGG SER IF-ACNC: 6.12 S/CO

## 2024-11-07 LAB
M TB IFN-G BLD-IMP: NEGATIVE
QUANTIFERON TB PLUS MITOGEN MINUS NIL: 5.98 IU/ML
QUANTIFERON TB PLUS NIL: 0.02 IU/ML
QUANTIFERON TB PLUS TB1 MINUS NIL: 0 IU/ML
QUANTIFERON TB PLUS TB2 MINUS NIL: 0 IU/ML

## 2024-11-08 ENCOUNTER — APPOINTMENT (OUTPATIENT)
Dept: VASCULAR SURGERY | Facility: CLINIC | Age: 44
End: 2024-11-08
Payer: COMMERCIAL

## 2024-11-08 DIAGNOSIS — I83.893 VARICOSE VEINS OF BILATERAL LOWER EXTREMITIES WITH OTHER COMPLICATIONS: ICD-10-CM

## 2024-11-08 PROCEDURE — 36465Z: CUSTOM

## 2024-11-14 ENCOUNTER — APPOINTMENT (OUTPATIENT)
Dept: VASCULAR SURGERY | Facility: CLINIC | Age: 44
End: 2024-11-14

## 2024-11-25 ENCOUNTER — APPOINTMENT (OUTPATIENT)
Dept: VASCULAR SURGERY | Facility: CLINIC | Age: 44
End: 2024-11-25
Payer: COMMERCIAL

## 2024-11-25 DIAGNOSIS — I83.893 VARICOSE VEINS OF BILATERAL LOWER EXTREMITIES WITH OTHER COMPLICATIONS: ICD-10-CM

## 2024-11-25 PROCEDURE — 93971 EXTREMITY STUDY: CPT

## 2024-11-25 PROCEDURE — 99213 OFFICE O/P EST LOW 20 MIN: CPT

## 2025-01-15 RX ORDER — SODIUM BICARBONATE 84 MG/ML
8.4 INJECTION, SOLUTION INTRAVENOUS
Qty: 5 | Refills: 0 | Status: ACTIVE | COMMUNITY
Start: 2025-01-15 | End: 1900-01-01

## 2025-01-15 RX ORDER — LIDOCAINE HYDROCHLORIDE 10 MG/ML
1 INJECTION, SOLUTION INFILTRATION; PERINEURAL
Qty: 500 | Refills: 0 | Status: ACTIVE | COMMUNITY
Start: 2025-01-15 | End: 1900-01-01

## 2025-01-15 RX ORDER — DIAZEPAM 5 MG/1
5 TABLET ORAL
Qty: 4 | Refills: 0 | Status: ACTIVE | COMMUNITY
Start: 2025-01-15 | End: 1900-01-01

## 2025-01-21 ENCOUNTER — APPOINTMENT (OUTPATIENT)
Dept: VASCULAR SURGERY | Facility: CLINIC | Age: 45
End: 2025-01-21
Payer: COMMERCIAL

## 2025-01-21 VITALS
HEART RATE: 58 BPM | RESPIRATION RATE: 14 BRPM | SYSTOLIC BLOOD PRESSURE: 100 MMHG | OXYGEN SATURATION: 99 % | DIASTOLIC BLOOD PRESSURE: 60 MMHG

## 2025-01-21 DIAGNOSIS — I83.893 VARICOSE VEINS OF BILATERAL LOWER EXTREMITIES WITH OTHER COMPLICATIONS: ICD-10-CM

## 2025-01-21 PROCEDURE — 37765 STAB PHLEB VEINS XTR 10-20: CPT | Mod: RT

## 2025-01-21 RX ORDER — OXYCODONE AND ACETAMINOPHEN 5; 325 MG/1; MG/1
5-325 TABLET ORAL
Qty: 10 | Refills: 0 | Status: ACTIVE | COMMUNITY
Start: 2025-01-21 | End: 1900-01-01

## 2025-02-03 ENCOUNTER — APPOINTMENT (OUTPATIENT)
Dept: VASCULAR SURGERY | Facility: CLINIC | Age: 45
End: 2025-02-03

## 2025-02-19 ENCOUNTER — NON-APPOINTMENT (OUTPATIENT)
Age: 45
End: 2025-02-19